# Patient Record
Sex: FEMALE | Race: WHITE | NOT HISPANIC OR LATINO | URBAN - METROPOLITAN AREA
[De-identification: names, ages, dates, MRNs, and addresses within clinical notes are randomized per-mention and may not be internally consistent; named-entity substitution may affect disease eponyms.]

---

## 2024-07-09 ENCOUNTER — TELEPHONE (OUTPATIENT)
Dept: UROGYNECOLOGY | Facility: CLINIC | Age: 69
End: 2024-07-09
Payer: MEDICARE

## 2024-07-09 NOTE — PROGRESS NOTES
[facility] UROGYN  Formerly [facility], [address]  [facility] [address]    Betty Nieves  [id number]  1955 July 12, 2024    Consulting Physician: Self, Aaareferral   GYN: MD Marija  Primary M.D.: Sigrid Price MD    Chief Complaint   Patient presents with    NEW PATIENT - RECTOCELE,        HPI:     History of Present Illness:    1)  Urinary incontinence:  --Do you leak have stress urinary leakage (leaking with cough, sneeze, exercise physical activity, etc)?  no   --Do leak have urge urinary leakage (run to the bathroom but can't get there in time)? yes   --Which is worse? Urge (if they are about equal, write equal)  --How many pads do you go through a day? 1/day  --How wet are your pads? minimum wetness   --How many pads do you go through a night? 0/day  --How wet are your pads?  --How often to you urinate during the day?  Q 3 hours.    --How many times do you urinate at night/during sleeping hours? 3  --Do you have any pain or burning with urination? no   --Do you have any blood that you can see in your urine? no   --Do you get frequent bladder infections/UTIs? (>3 in 1 year or 2 in 6 months per documented, positive urine culture): no   --When urinating, do you feel like you empty your bladder completely? no     2)  Pelvic organ prolapse:  --Do you notice something bulging from your vagina? yes   --If so, how far does it come out?  To the vaginal opening  --Do you have symptoms from the bulge? no   --If so, what are those symptoms?   --Are you having any vaginal bleeding or abnormal menstrual bleeding? no   --Are you having any bothersome vaginal discharge? yes    --feels irritated  --Are you sexually active? no   --Do you have pain with intercourse?  None in past  --If so, please describe the pain:   --Do you have vaginal dryness? no    --If so, do you use vaginal estrogen cream for the dryness? Yes tried using but unable insert de to prolapse. Used 2 weeks     3)  Bowel  habits:    --Are you often constipated? yes   --Do you often have to strain with your bowel movements? yes   --Is your stool consistency often hard/pellet-like? yes   --Do you have diarrhea or loose stool on a regular basis? no   --Do you notice blood in your stool? no   --Do you loose control of your bowel movements on a regular basis? no   --Do you every have a strong urge to have a bowel movement and have a small amount of stool in your underwear when you get to the bathroom? no   --When you have a bowel movement, do you feel like you empty completely? no    --If not, do you push in or around the vagina/anus to help empty? no   --Do you ever have tissue bulging from the anus that stays out? no     Past Medical History:  Past Medical History:   Diagnosis Date    Essential (primary) hypertension     Mixed hyperlipidemia    --GERD  --hypothyroid    Past Surgical History:  Past Surgical History:   Procedure Laterality Date     SECTION      HYSTERECTOMY      THYROIDECTOMY      TOE SURGERY Left    C/s for previa    Hysterectomy: yes   Date: .  Indication: increase vaginal issues, infection.    Type: xlap/vert  Cervix present: no   Ovaries present: yes     Obstetrical History:  OB History    Para Term  AB Living   2             SAB IAB Ectopic Multiple Live Births                  # Outcome Date GA Lbr Donovan/2nd Weight Sex Type Anes PTL Lv   2     3.402 kg (7 lb 8 oz)  CS-LTranv      1          FD       # of vaginal deliveries: 0  # of  sections: 1  Largest infant weight: 7.8 lbs  Use of forceps or vacuum to assist any delivery: no   Episiotomy with any delivery: no     Gynecological History:  LMP: No LMP recorded.  Age of menarche: 12  Age of menopause: Hysterectomy  Last pap test: year: .  Result:   History of abnormal paps: no    History of STIs:  no   Mammogram: Date of last: .  Result: Normal  Colonoscopy: Date of last: .  Result:  normal.  Repeat  due:  2026.  DEXA:  Date of last: 2023.  Result:  osteoporosis. Supposed to be taking Ca.  Repeat due: per PCP.      Family History:  Family History   Problem Relation Name Age of Onset    Breast cancer Maternal Aunt      Colon cancer Paternal Aunt        Colon CA: No  Breast CA: Yes - Maternal Aunt  GYN CA (ovary, uterine): No   CA (kidney, bladder): No    Social History:  Social History     Socioeconomic History    Marital status:    Tobacco Use    Smoking status: Never    Smokeless tobacco: Never   Substance and Sexual Activity    Alcohol use: Not Currently    Drug use: Never    Sexual activity: Not Currently     Social Determinants of Health     Food Insecurity: No Food Insecurity (3/20/2024)    Received from [facility] Vital Sign     Worried About Running Out of Food in the Last Year: Never true     Ran Out of Food in the Last Year: Never true   --still lives in [place]  --wound care RN    Past Ob History  [id number]    Allergies  Review of patient's allergies indicates:  Not on File      Current Outpatient Medications:     pantoprazole (PROTONIX) 40 MG tablet, Take 1 tablet by mouth once daily., Disp: , Rfl:     aspirin (ECOTRIN) 81 MG EC tablet, Take 81 mg by mouth., Disp: , Rfl:     atorvastatin (LIPITOR) 20 MG tablet, Take 1 tablet by mouth once daily., Disp: , Rfl:     bisoprolol (ZEBETA) 5 MG tablet, Take 5 mg by mouth once daily., Disp: , Rfl:     celecoxib (CELEBREX) 200 MG capsule, Take 200 mg by mouth., Disp: , Rfl:     cetirizine (ZYRTEC) 10 MG tablet, Take 10 mg by mouth once daily., Disp: , Rfl:     montelukast (SINGULAIR) 10 mg tablet, Take 10 mg by mouth every evening., Disp: , Rfl:   Synthroid 0.125 mcg daily    Review of Systems A 14 point ROS was reviewed with pertinent positives as noted above in the history of present illness.      Constitutional: negative  Eyes: negative  Endocrine: negative  Gastrointestinal: negative  Cardiovascular: negative  Respiratory:  "negative  Allergic/Immunologic: negative  Integumentary: negative  Psychiatric: negative  Musculoskeletal: negative   Ear/Nose/Throat: negative  Neurologic: negative  Genitourinary: SEE HPI  Hematologic/Lymphatic: negative   Breast: negative    Urogynecologic Exam  /69   Pulse 89   Resp 20   Ht 5' 10" (1.778 m)   Wt 107.9 kg (237 lb 14 oz)   BMI 34.13 kg/m²     GENERAL APPEARANCE:  The patient is well-developed, well-nourished.   Neck:  Supple with no thyromegaly, no carotid bruits.  Heart:  Regular rate and rhythm, no murmurs, rubs or gallops.  Lungs:  Clear.  No CVA tenderness.  Abdomen:  Soft, nontender, nondistended, no hepatosplenomegaly.  Incisions:  vert midline well-healed    PELVIC:    External genitalia:  Normal Bartholins, Skenes and labia bilaterally.    Urethra:  No caruncle, diverticulum or masses.  (+) hypermobility.    Vagina:  Atrophy (+) , no bladder masses or tender, no discharge.    Cervix:  absent  Uterus: uterus absent  Adnexa: Not palpable.    POP-Q:  Aa -1; Ba -1; C -8; Ap 0; Bp 0.  Genital hiatus 3, perineal body 2, total vaginal length 9.    NEUROLOGIC:  Cranial nerves 2 through 12 intact.  Strength 5/5.  DTRs 2+ lower extremities.  S2 through 4 normal.  Sacral reflexes intact.    EXT: EDMONDSON, 2+ pulses bilaterally, no C/C/E    COUGH STRESS TEST:  negative  KEGEL: 1 /5--some confusion    RECTAL:    External:  Normal, (--) hemorrhoids, (--) dovetailing.   Internal:   (--) tenderness, (--) masses, Normal resting tone, Normal active tone. Moderate distal pocketing.  Grossly heme NEG.     PVR: 30 mL    Impression    1. Urinary incontinence, urge    2. Rectocele, female    3. Vaginal atrophy    4. Chronic constipation    5. Incomplete defecation        Initial Plan  The patient was counseled regarding these issues. The patient was given a summary sheet containing each of these issues with possible options for evaluation and management. When appropriate, we also reviewed " computer-generated diagrams specific to their diagnoses..  All questions were addressed to the patient's satisfaction.    1)  Stage 2 rectocele:  --discussed  --options: watch/PT vs pessary vs surgery  --if surgery:  posterior repair  --if surgery:  office cystometry to see if have hidden stress leakage and need treatment (sling or injections)  --if surgery: clearance per PCP Isabela) + labs (CBC, BMP, TSH, T&S)/EKG    2)  Vaginal atrophy (dryness):    --start Vaginal estrogen:  --Use 0.5 grams of estrogen cream in vagina with applicator or dime-sized amount with finger (as far as can reach internally) nightly x 2 weeks, then twice a week thereafter.  You can also apply a dime-sized amount with your finger around the vaginal opening and inner lips at same frequency.     --Vaginal estrogen may help to decrease pain related to dryness with intercourse and urinary symptoms (urgency/frequency/UTIs) around menopause.    [email or web address]_Vaginal_Estrogen_Therapy.pdf    3)  Urge incontinence:  --urine C&S  --Empty bladder every 3 hours.  Empty well: wait a minute, lean forward on toilet.    --Avoid dietary irritants (see sheet).  Keep diary x 3-5 days to determine your irritants.  --KEGELS: do 10 in AM and 10 in PM, holding each x 10 seconds.  When you feel urge to go, STOP, KEGEL, and when urge has passed, then go to bathroom.  Consider PT in future.    [facility] [place] [address]  **water therapy  [email or web address]  (p) [phone]  (f) [phone]    --URGE: consider medication in future.  Takes 2-4 weeks to see if will have effect.  For dry mouth: get sour, sugar free lozenge or gum.      4)  Constipation, feeling of incomplete evacuation:  --high fiber  --hydrate well  Controlling constipation may help bladder urgency/leakage and fiber may better control cholesterol and blood glucose.  Start daily fiber.  Take 1 tsp of fiber powder  (psyllium or other sugar-free powder).  Mix in 8 oz of water.  Take x 3-5 days.  Then, increase fiber by 1 tsp every 3-5 days until stool is easy to pass.  Stop and continue at that dose.   Do not exceed 6 tsps/day.  May also use over the counter stool softener 1-2 x/day.    --consider daily miralax (1/4-1 capful)  --straining makes bulge worse  --rectocele may effect emptying    5)  VV in 3-4 months to see how doing.     Approximately 60 min were spent in consult, 90 % in discussion.     Thank you for requesting consultation of your patient.  I look forward to participating in their care.    Kendrick Robles  Female Pelvic Medicine and Reconstructive Surgery  [facility] [place], [place]

## 2024-07-12 ENCOUNTER — PATIENT MESSAGE (OUTPATIENT)
Dept: UROGYNECOLOGY | Facility: CLINIC | Age: 69
End: 2024-07-12
Payer: COMMERCIAL

## 2024-07-12 ENCOUNTER — OFFICE VISIT (OUTPATIENT)
Dept: UROGYNECOLOGY | Facility: CLINIC | Age: 69
End: 2024-07-12
Payer: MEDICARE

## 2024-07-12 VITALS
HEIGHT: 70 IN | SYSTOLIC BLOOD PRESSURE: 130 MMHG | RESPIRATION RATE: 20 BRPM | BODY MASS INDEX: 34.06 KG/M2 | HEART RATE: 89 BPM | WEIGHT: 237.88 LBS | DIASTOLIC BLOOD PRESSURE: 69 MMHG

## 2024-07-12 DIAGNOSIS — N39.41 URINARY INCONTINENCE, URGE: Primary | ICD-10-CM

## 2024-07-12 DIAGNOSIS — N81.6 RECTOCELE, FEMALE: ICD-10-CM

## 2024-07-12 DIAGNOSIS — R15.0 INCOMPLETE DEFECATION: ICD-10-CM

## 2024-07-12 DIAGNOSIS — K59.09 CHRONIC CONSTIPATION: ICD-10-CM

## 2024-07-12 DIAGNOSIS — N95.2 VAGINAL ATROPHY: ICD-10-CM

## 2024-07-12 PROCEDURE — 87086 URINE CULTURE/COLONY COUNT: CPT | Performed by: OBSTETRICS & GYNECOLOGY

## 2024-07-12 RX ORDER — CETIRIZINE HYDROCHLORIDE 10 MG/1
10 TABLET ORAL DAILY
COMMUNITY

## 2024-07-12 RX ORDER — MONTELUKAST SODIUM 10 MG/1
10 TABLET ORAL NIGHTLY
COMMUNITY

## 2024-07-12 RX ORDER — ASPIRIN 81 MG/1
81 TABLET ORAL
COMMUNITY

## 2024-07-12 RX ORDER — BISOPROLOL FUMARATE 5 MG/1
5 TABLET, FILM COATED ORAL DAILY
COMMUNITY

## 2024-07-12 RX ORDER — ATORVASTATIN CALCIUM 20 MG/1
1 TABLET, FILM COATED ORAL DAILY
COMMUNITY

## 2024-07-12 RX ORDER — CELECOXIB 200 MG/1
200 CAPSULE ORAL
COMMUNITY

## 2024-07-12 RX ORDER — PANTOPRAZOLE SODIUM 40 MG/1
1 TABLET, DELAYED RELEASE ORAL DAILY
COMMUNITY
Start: 2023-10-05

## 2024-07-12 NOTE — PATIENT INSTRUCTIONS
Bladder Irritants  Certain foods and drinks have been associated with worsening symptoms of urinary frequency, urgency, urge incontinence, or bladder pain. If you suffer from any of these conditions, you may wish to try eliminating one or more of these foods from your diet and see if your symptoms improve. If bladder symptoms are related to dietary factors, strict adherence to a diet thateliminates the food should bring marked relief in 10 days. Once you are feeling better, you can begin to add foods back into your diet, one at a time. If symptoms return, you will be able to identify the irritant. As you add foods back to your diet it is very important that you drink significant amounts of water.    -----------------------------------------------------------------------------------------------  List of Common Bladder Irritants*  Alcoholic beverages  Apples and apple juice  Cantaloupe  Carbonated beverages  Chili and spicy foods  Chocolate  Citrus fruit  Coffee (including decaffeinated)  Cranberries and cranberry juice  Grapes  Guava  Milk Products: milk, cheese, cottage cheese, yogurt, ice cream  Peaches  Pineapple  Plums  Strawberries  Sugar especially artificial sweeteners, saccharin, aspartame, corn sweeteners, honey, fructose, sucrose, lactose  Tea  Tomatoes and tomato juice  Vitamin B complex  Vinegar  *Most people are not sensitive to ALL of these products; your goal is to find the foods that make YOUR symptoms worse.  ---------------------------------------------------------------------------------------------------    Low-acid fruit substitutions include apricots, papaya, pears and watermelon. Coffee drinkers can drink Kava or other lowacid instant drinks. Tea drinkers can substitute non-citrus herbal and sun brewed teas. Calcium carbonate co-buffered with calcium ascorbate can be substituted for Vitamin C. Prelief is a dietary supplement that works as an acid blocker for the bladder.    Where to get more  information:        Overcoming Bladder Disorders by Germania Candelario and Gideon Reina, 1990        You Dont Have to Live with Cystitis! By Aparna Bernardo, 1988  http://[email or web address].org/oab  -----------------------------------------------------------------------------------    1)  Stage 2 rectocele:  --discussed  --options: watch/PT vs pessary vs surgery  --if surgery:  posterior repair  --if surgery:  office cystometry to see if have hidden stress leakage and need treatment (sling or injections)  --if surgery: clearance per PCP (Albert) + labs (CBC, BMP, TSH, T&S)/EKG    2)  Vaginal atrophy (dryness):    --start Vaginal estrogen:  --Use 0.5 grams of estrogen cream in vagina with applicator or dime-sized amount with finger (as far as can reach internally) nightly x 2 weeks, then twice a week thereafter.  You can also apply a dime-sized amount with your finger around the vaginal opening and inner lips at same frequency.     --Vaginal estrogen may help to decrease pain related to dryness with intercourse and urinary symptoms (urgency/frequency/UTIs) around menopause.    [email or web address]/Low-Dose_Vaginal_Estrogen_Therapy.pdf    3)  Urge incontinence:  --urine C&S  --Empty bladder every 3 hours.  Empty well: wait a minute, lean forward on toilet.    --Avoid dietary irritants (see sheet).  Keep diary x 3-5 days to determine your irritants.  --KEGELS: do 10 in AM and 10 in PM, holding each x 10 seconds.  When you feel urge to go, STOP, KEGEL, and when urge has passed, then go to bathroom.  Consider PT in future.    [facility] [address] [address]  **water therapy  [email or web address]  (p) [phone]  (f) [phone]    --URGE: consider medication in future.  Takes 2-4 weeks to see if will have effect.  For dry mouth: get sour, sugar free lozenge or gum.      4)  Constipation, feeling of incomplete  evacuation:  --high fiber  --hydrate well  Controlling constipation may help bladder urgency/leakage and fiber may better control cholesterol and blood glucose.  Start daily fiber.  Take 1 tsp of fiber powder (psyllium or other sugar-free powder).  Mix in 8 oz of water.  Take x 3-5 days.  Then, increase fiber by 1 tsp every 3-5 days until stool is easy to pass.  Stop and continue at that dose.   Do not exceed 6 tsps/day.  May also use over the counter stool softener 1-2 x/day.    --consider daily miralax (1/4-1 capful)  --straining makes bulge worse  --rectocele may effect emptying    5)  VV in 3-4 months to see how doing.

## 2024-07-13 LAB — BACTERIA UR CULT: NORMAL

## 2024-11-12 ENCOUNTER — TELEPHONE (OUTPATIENT)
Dept: UROGYNECOLOGY | Facility: CLINIC | Age: 69
End: 2024-11-12
Payer: MEDICARE

## 2024-11-20 ENCOUNTER — OFFICE VISIT (OUTPATIENT)
Dept: UROGYNECOLOGY | Facility: CLINIC | Age: 69
End: 2024-11-20
Payer: MEDICARE

## 2024-11-20 ENCOUNTER — PATIENT MESSAGE (OUTPATIENT)
Dept: UROGYNECOLOGY | Facility: CLINIC | Age: 69
End: 2024-11-20
Payer: MEDICARE

## 2024-11-20 DIAGNOSIS — N81.6 RECTOCELE, FEMALE: ICD-10-CM

## 2024-11-20 DIAGNOSIS — N95.2 VAGINAL ATROPHY: ICD-10-CM

## 2024-11-20 DIAGNOSIS — N39.41 URINARY INCONTINENCE, URGE: Primary | ICD-10-CM

## 2024-11-20 DIAGNOSIS — R15.0 INCOMPLETE DEFECATION: ICD-10-CM

## 2024-11-20 DIAGNOSIS — K59.09 CHRONIC CONSTIPATION: ICD-10-CM

## 2024-11-20 NOTE — PROGRESS NOTES
Urogyn follow up  11/20/2024    DEE DEE BUENO - OBGYN [facility] [place]  [address]  [facility] [address]    Betty Nieves  [id number]  1955    The patient location is: home  The chief complaint leading to consultation is: follow up    Visit type: audiovisual    Face to Face time with patient: 20 min  20 minutes of total time spent on the encounter, which includes face to face time and non-face to face time preparing to see the patient (eg, review of tests), Obtaining and/or reviewing separately obtained history, Documenting clinical information in the electronic or other health record, Independently interpreting results (not separately reported) and communicating results to the patient/family/caregiver, or Care coordination (not separately reported).     Each patient to whom he or she provides medical services by telemedicine is:  (1) informed of the relationship between the physician and patient and the respective role of any other health care provider with respect to management of the patient; and (2) notified that he or she may decline to receive medical services by telemedicine and may withdraw from such care at any time.    Notes:       Betty Nieves is a 69 y.o. here for follow up. The patient's last visit with me was on 7/12/2024.    History of Present Illness:    1)  Urinary incontinence:  --Do you leak have stress urinary leakage (leaking with cough, sneeze, exercise physical activity, etc)?  no   --Do leak have urge urinary leakage (run to the bathroom but can't get there in time)? yes   --Which is worse? Urge (if they are about equal, write equal)  --How many pads do you go through a day? 1/day  --How wet are your pads? minimum wetness   --How many pads do you go through a night? 0/day  --How wet are your pads?  --How often to you urinate during the day?  Q 3 hours.    --How many times do you urinate at night/during sleeping hours? 3  --Do you have any pain or burning with urination? no   --Do you have  any blood that you can see in your urine? no   --Do you get frequent bladder infections/UTIs? (>3 in 1 year or 2 in 6 months per documented, positive urine culture): no   --When urinating, do you feel like you empty your bladder completely? no     2)  Pelvic organ prolapse:  --Do you notice something bulging from your vagina? yes   --If so, how far does it come out?  To the vaginal opening  --Do you have symptoms from the bulge? no   --If so, what are those symptoms?   --Are you having any vaginal bleeding or abnormal menstrual bleeding? no   --Are you having any bothersome vaginal discharge? yes    --feels irritated  --Are you sexually active? no   --Do you have pain with intercourse?  None in past  --If so, please describe the pain:   --Do you have vaginal dryness? no    --If so, do you use vaginal estrogen cream for the dryness? Yes tried using but unable insert de to prolapse. Used 2 weeks   -POP-Q:  Aa -1; Ba -1; C -8; Ap 0; Bp 0.  Genital hiatus 3, perineal body 2, total vaginal length 9.    3)  Bowel habits:    --Are you often constipated? yes   --Do you often have to strain with your bowel movements? yes   --Is your stool consistency often hard/pellet-like? yes   --Do you have diarrhea or loose stool on a regular basis? no   --Do you notice blood in your stool? no   --Do you loose control of your bowel movements on a regular basis? no   --Do you every have a strong urge to have a bowel movement and have a small amount of stool in your underwear when you get to the bathroom? no   --When you have a bowel movement, do you feel like you empty completely? no    --If not, do you push in or around the vagina/anus to help empty? no   --Do you ever have tissue bulging from the anus that stays out? no     Past Medical History:  Past Medical History:   Diagnosis Date    Essential (primary) hypertension     Mixed hyperlipidemia    --GERD  --hypothyroid    Past Surgical History:  Past Surgical History:   Procedure  Laterality Date     SECTION      HYSTERECTOMY      THYROIDECTOMY  2014    TOE SURGERY Left    C/s for previa    Hysterectomy: yes   Date: .  Indication: increase vaginal issues, infection.    Type: xlap/vert  Cervix present: no   Ovaries present: yes     Obstetrical History:  OB History    Para Term  AB Living   2             SAB IAB Ectopic Multiple Live Births                  # Outcome Date GA Lbr Donovan/2nd Weight Sex Type Anes PTL Lv   2     3.402 kg (7 lb 8 oz)  CS-LTranv      1          FD       # of vaginal deliveries: 0  # of  sections: 1  Largest infant weight: 7.8 lbs  Use of forceps or vacuum to assist any delivery: no   Episiotomy with any delivery: no     Gynecological History:  LMP: No LMP recorded.  Age of menarche: 12  Age of menopause: Hysterectomy  Last pap test: year: .  Result:   History of abnormal paps: no    History of STIs:  no   Mammogram: Date of last: .  Result: Normal  Colonoscopy: Date of last: .  Result:  normal.  Repeat due:  .  DEXA:  Date of last: .  Result:  osteoporosis. Supposed to be taking Ca.  Repeat due: per PCP.      Family History:  Family History   Problem Relation Name Age of Onset    Breast cancer Maternal Aunt      Colon cancer Paternal Aunt        Colon CA: No  Breast CA: Yes - Maternal Aunt  GYN CA (ovary, uterine): No   CA (kidney, bladder): No    Social History:  Social History     Socioeconomic History    Marital status:    Tobacco Use    Smoking status: Never    Smokeless tobacco: Never   Substance and Sexual Activity    Alcohol use: Not Currently    Drug use: Never    Sexual activity: Not Currently     Social Determinants of Health     Food Insecurity: No Food Insecurity (3/20/2024)    Received from [facility]    Hunger Vital Sign     Worried About Running Out of Food in the Last Year: Never true     Ran Out of Food in the Last Year: Never true   --still lives in  [place]  --wound care RN    Issues include:  Patient Active Problem List   Diagnosis    Urinary incontinence, urge    Rectocele, female    Vaginal atrophy    Chronic constipation    Incomplete defecation     History since last visit:   1)  Stage 2 rectocele:  --feels better, less prominent since not straining as much    2)  Vaginal atrophy (dryness):    --used estrogen cream x 2 weeks--stopped    3)  Urge incontinence:  --went to PT x 1--learned things and practicing  --currently: not needing to use pad but does having some urgency    4)  Constipation, feeling of incomplete evacuation:  --taking fiber powder 1 capful daily  (Gummies gave her gas)  --not having to strain  --waits until has strong urge  --evacuating better    Medications:    Current Outpatient Medications:     aspirin (ECOTRIN) 81 MG EC tablet, Take 81 mg by mouth., Disp: , Rfl:     atorvastatin (LIPITOR) 20 MG tablet, Take 1 tablet by mouth once daily., Disp: , Rfl:     bisoprolol (ZEBETA) 5 MG tablet, Take 5 mg by mouth once daily., Disp: , Rfl:     celecoxib (CELEBREX) 200 MG capsule, Take 200 mg by mouth., Disp: , Rfl:     cetirizine (ZYRTEC) 10 MG tablet, Take 10 mg by mouth once daily., Disp: , Rfl:     montelukast (SINGULAIR) 10 mg tablet, Take 10 mg by mouth every evening., Disp: , Rfl:     pantoprazole (PROTONIX) 40 MG tablet, Take 1 tablet by mouth once daily., Disp: , Rfl:     ROS:  As per HPI.      Exam  There were no vitals taken for this visit.  General: alert and oriented, no acute distress  Remainder of PE deferred.    Impression  1. Urinary incontinence, urge        2. Rectocele, female        3. Vaginal atrophy        4. Chronic constipation        5. Incomplete defecation          We reviewed the above issues and discussed options for short-term versus long-term management of their problems.   Plan:   1)  Stage 2 rectocele:  --discussed  --options: watch/PT vs pessary vs surgery  --if surgery:  posterior repair  --if surgery:   office cystometry to see if have hidden stress leakage and need treatment (sling or injections)  --if surgery: clearance per PCP (Albert) + labs (CBC, BMP, TSH, T&S)/EKG  --continue behavioral modifications  --let us know if worsens    2)  Vaginal atrophy (dryness):    --restart Vaginal estrogen:  --Use 0.5 grams of estrogen cream in vagina with applicator or dime-sized amount with finger (as far as can reach internally) at night 2-3x/ A WEEK.  You can also apply a dime-sized amount with your finger around the vaginal opening and inner lips at same frequency.     --Vaginal estrogen may help to decrease pain related to dryness with intercourse and urinary symptoms (urgency/frequency/UTIs) around menopause.     3)  Urge incontinence:  --Empty bladder every 3 hours.  Empty well: wait a minute, lean forward on toilet.    --Avoid dietary irritants (see sheet).  Keep diary x 3-5 days to determine your irritants.  --continue pelvic floor PT exercises at home  --URGE: consider medication in future.  Takes 2-4 weeks to see if will have effect.  For dry mouth: get sour, sugar free lozenge or gum.      4)  Constipation, feeling of incomplete evacuation:  --high fiber  --hydrate well  --Controlling constipation may help bladder urgency/leakage and fiber may better control cholesterol and blood glucose. Continue daily fiber.   --consider daily miralax (1/4-1 capful)  --straining makes bulge worse  --rectocele may effect emptying    5)  RTC as needed.     20 minutes were spent in face to face time with this patient  100 % of this time was spent in counseling and/or coordination of care     Kendrick Robles MD  [facility]  Division of Female Pelvic Medicine and Reconstructive Surgery  Department of Obstetrics & Gynecology